# Patient Record
Sex: FEMALE | URBAN - METROPOLITAN AREA
[De-identification: names, ages, dates, MRNs, and addresses within clinical notes are randomized per-mention and may not be internally consistent; named-entity substitution may affect disease eponyms.]

---

## 2019-04-15 ENCOUNTER — TELEPHONE (OUTPATIENT)
Dept: ONCOLOGY | Facility: CLINIC | Age: 29
End: 2019-04-15

## 2019-04-15 NOTE — TELEPHONE ENCOUNTER
ONCOLOGY INTAKE: Records Information      APPT INFORMATION:  Referring provider:  Maryanne Moody  Referring provider s clinic:  [facility]  Reason for visit/diagnosis:  Genetics: Family Hx Breast Cancer    RECORDS INFORMATION:  Were the records received with the referral (via [employer])? Yes    ADDITIONAL INFORMATION:  Please fax referral/records to [phone] upon scheduling